# Patient Record
Sex: FEMALE | Race: WHITE
[De-identification: names, ages, dates, MRNs, and addresses within clinical notes are randomized per-mention and may not be internally consistent; named-entity substitution may affect disease eponyms.]

---

## 2018-10-09 ENCOUNTER — HOSPITAL ENCOUNTER (OUTPATIENT)
Dept: HOSPITAL 89 - RAD | Age: 18
End: 2018-10-09
Attending: EMERGENCY MEDICINE
Payer: COMMERCIAL
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
## 2018-10-09 DIAGNOSIS — S99.922A: Primary | ICD-10-CM

## 2018-10-09 NOTE — RADIOLOGY IMAGING REPORT
[place] [facility] [place] [place] 

 

PATIENT NAME: Shelly Jansen

: 2000

MR: [id number]

V: [id number]

EXAM DATE: 

ORDERING PHYSICIAN: IJEOMA JORDAN

TECHNOLOGIST: 

 

Location: [facility] [place]

Patient: Shelly Jansen

: 2000

MRN: [id number]

Visit/Account:[id number]

Date of Sevice: 10/09/2018

 

ACCESSION #: [id number]

 

Exam type: FOOT 3 VIEW LEFT

 

History: .  Metal panel on left foot

 

Comparison: None.

 

Findings:

 

There is no evidence of acute fracture dislocation or radiopaque soft tissue foreign body involving t
he left foot.  No significant arthritic change identified.  No evidence of lytic or blastic bone lesi
ons

 

IMPRESSION:

 

1.  No acute osteoarticular abnormality of the left foot is seen

 

Report Dictated By: Denise Fleming MD at 10/9/2018 9:15 AM

 

Report E-Signed By: Denise Fleming MD  at 10/9/2018 9:20 AM

 

WSN:[id number]